# Patient Record
Sex: FEMALE | Race: AMERICAN INDIAN OR ALASKA NATIVE
[De-identification: names, ages, dates, MRNs, and addresses within clinical notes are randomized per-mention and may not be internally consistent; named-entity substitution may affect disease eponyms.]

---

## 2022-09-07 LAB
ALBUMIN SERPL-MCNC: 3.8 G/DL (ref 3.9–5)
ALT SERPL-CCNC: 15 UNITS/L (ref 7–56)
BUN SERPL-MCNC: 9 MG/DL (ref 7–17)
BUN/CREAT SERPL: 15 %
CALCIUM SERPL-MCNC: 9.1 MG/DL (ref 8.4–10.2)
HCT VFR BLD CALC: 41.5 % (ref 30.3–42.9)
HEMOLYSIS INDEX: 2
HGB BLD-MCNC: 13.5 GM/DL (ref 10.1–14.3)
MCHC RBC AUTO-ENTMCNC: 33 % (ref 30–34)
MCV RBC AUTO: 93 FL (ref 79–97)
PLATELET # BLD: 308 K/MM3 (ref 140–440)
RBC # BLD AUTO: 4.45 M/MM3 (ref 3.65–5.03)

## 2022-09-07 NOTE — ANESTHESIA CONSULTATION
Anesthesia Consult and Med Hx


Date of service: 09/12/22





- Airway


Anesthetic Teeth Evaluation: Good (One missing)


ROM Head & Neck: Adequate


Mental/Hyoid Distance: Adequate


Mallampati Class: Class II


Intubation Access Assessment: Good





- Pre-Operative Health Status


ASA Pre-Surgery Classification: ASA3


Proposed Anesthetic Plan: General





- Pulmonary


Hx Smoking: Yes (FORMER SMOKER. STOPPED 01/01/22.)


Hx Asthma: No


Hx Respiratory Symptoms: No (+2FS; walks four miles)


COPD: No


Hx Pneumonia: No


Hx Sleep Apnea: No





- Central Nervous System


Hx Psychiatric Problems: No





- Hematic


Hx Anemia: No


Hx Sickle Cell Disease: No





- Other Systems


Hx Alcohol Use: Yes (2 DRINKS/MONTH)


Hx Substance Use: No


Hx Cancer: No


Hx Obesity: Yes





- Additional Comments


Anesthesia Medical History Comments: Cardiac & pulmonary Reports reviewed

## 2022-09-12 ENCOUNTER — HOSPITAL ENCOUNTER (INPATIENT)
Dept: HOSPITAL 5 - 3A | Age: 32
LOS: 1 days | Discharge: HOME | DRG: 621 | End: 2022-09-13
Attending: SURGERY | Admitting: SURGERY
Payer: COMMERCIAL

## 2022-09-12 DIAGNOSIS — Z87.891: ICD-10-CM

## 2022-09-12 DIAGNOSIS — Z20.822: ICD-10-CM

## 2022-09-12 DIAGNOSIS — Z88.0: ICD-10-CM

## 2022-09-12 DIAGNOSIS — E66.01: Primary | ICD-10-CM

## 2022-09-12 PROCEDURE — 85027 COMPLETE CBC AUTOMATED: CPT

## 2022-09-12 PROCEDURE — 0BQT4ZZ REPAIR DIAPHRAGM, PERCUTANEOUS ENDOSCOPIC APPROACH: ICD-10-PCS | Performed by: SURGERY

## 2022-09-12 PROCEDURE — 88307 TISSUE EXAM BY PATHOLOGIST: CPT

## 2022-09-12 PROCEDURE — 0DB64Z3 EXCISION OF STOMACH, PERCUTANEOUS ENDOSCOPIC APPROACH, VERTICAL: ICD-10-PCS | Performed by: SURGERY

## 2022-09-12 PROCEDURE — 84703 CHORIONIC GONADOTROPIN ASSAY: CPT

## 2022-09-12 PROCEDURE — 85025 COMPLETE CBC W/AUTO DIFF WBC: CPT

## 2022-09-12 PROCEDURE — 80053 COMPREHEN METABOLIC PANEL: CPT

## 2022-09-12 PROCEDURE — C9113 INJ PANTOPRAZOLE SODIUM, VIA: HCPCS

## 2022-09-12 PROCEDURE — 36415 COLL VENOUS BLD VENIPUNCTURE: CPT

## 2022-09-12 PROCEDURE — 88342 IMHCHEM/IMCYTCHM 1ST ANTB: CPT

## 2022-09-12 PROCEDURE — U0003 INFECTIOUS AGENT DETECTION BY NUCLEIC ACID (DNA OR RNA); SEVERE ACUTE RESPIRATORY SYNDROME CORONAVIRUS 2 (SARS-COV-2) (CORONAVIRUS DISEASE [COVID-19]), AMPLIFIED PROBE TECHNIQUE, MAKING USE OF HIGH THROUGHPUT TECHNOLOGIES AS DESCRIBED BY CMS-2020-01-R: HCPCS

## 2022-09-12 RX ADMIN — SIMETHICONE PRN MG: 80 TABLET, CHEWABLE ORAL at 17:38

## 2022-09-12 RX ADMIN — MIDAZOLAM NR MG: 1 INJECTION INTRAMUSCULAR; INTRAVENOUS at 12:48

## 2022-09-12 RX ADMIN — ENOXAPARIN SODIUM SCH: 100 INJECTION SUBCUTANEOUS at 17:41

## 2022-09-12 RX ADMIN — ACETAMINOPHEN NR MLS/HR: 10 INJECTION, SOLUTION INTRAVENOUS at 18:33

## 2022-09-12 RX ADMIN — MIDAZOLAM NR MG: 1 INJECTION INTRAMUSCULAR; INTRAVENOUS at 11:38

## 2022-09-12 RX ADMIN — ACETAMINOPHEN NR MLS/HR: 10 INJECTION, SOLUTION INTRAVENOUS at 12:44

## 2022-09-12 RX ADMIN — SIMETHICONE PRN MG: 80 TABLET, CHEWABLE ORAL at 23:09

## 2022-09-12 RX ADMIN — ACETAMINOPHEN SCH MLS/HR: 10 INJECTION, SOLUTION INTRAVENOUS at 18:34

## 2022-09-12 RX ADMIN — METRONIDAZOLE SCH MLS/HR: 5 INJECTION, SOLUTION INTRAVENOUS at 21:32

## 2022-09-12 RX ADMIN — KETOROLAC TROMETHAMINE SCH MG: 30 INJECTION, SOLUTION INTRAMUSCULAR at 21:31

## 2022-09-12 RX ADMIN — SODIUM CHLORIDE, SODIUM LACTATE, POTASSIUM CHLORIDE, AND CALCIUM CHLORIDE SCH MLS/HR: .6; .31; .03; .02 INJECTION, SOLUTION INTRAVENOUS at 11:39

## 2022-09-12 RX ADMIN — KETOROLAC TROMETHAMINE SCH: 30 INJECTION, SOLUTION INTRAMUSCULAR at 17:29

## 2022-09-12 RX ADMIN — SODIUM CHLORIDE, SODIUM LACTATE, POTASSIUM CHLORIDE, AND CALCIUM CHLORIDE SCH MLS/HR: .6; .31; .03; .02 INJECTION, SOLUTION INTRAVENOUS at 17:38

## 2022-09-12 RX ADMIN — PANTOPRAZOLE SODIUM SCH MG: 40 INJECTION, POWDER, FOR SOLUTION INTRAVENOUS at 15:51

## 2022-09-12 NOTE — OPERATIVE REPORT
Operative Report


Operative Report: 





DATE:9/12/2022





Surgeon: Rosario Jovel MD





Assistant surgeon:





Pre-op Dx: morbid obesity





Post-op Dx: morbid obesity





Procedure: 1. laparoscopic sleeve gastrectomy





Anesthesia: GETA and TAP block





EBL: <10ml





Specimen: gastric remnant





Complication: none immediate





Indication: 32 year old female with a history of morbid obesity . Pt is here for

sleeve gastrectomy for weight loss to achieve healthier weight and improve or 

resolve his co-morbidities. She expressed understanding of the risks and 

benefits. 





PROCEDURE IN DETAIL: After consent was reviewed, patient was taken back to the 

operating room, where patient was placed supine on the bed with both arms out. 

The patient's legs were doubly strapped to the bed. Patient had a foot board in 

place. Patient had a body warmer placed by anesthesia. General anesthesia was 

induced with successful endotracheal intubation. Patient was then prepped and 

draped in normal sterile surgical fashion. After a time-out was called, I made a

stab incision in the left subcostal area and placed a Veress needle through this

incision and insufflated the abdomen to 15 mmHg pressure. I then counted down a 

handsbreadth below the xiphoid process in the midline and slightly left lateral 

injected local anesthetic and made about 1 cm transverse incision. I then used a

5-mm Optiview trocar to enter into the abdomen. There was no gross injury to any

intra-abdominal structures.  I then placed a 30-degree scope through this port 

and inspected the abdomen. I then placed a 8-mm port in the right upper 

quadrant,  and 1 5mm in the epigastric area below the costovertebral angle. I 

then placed a 15-mm port about a handsbreadth in the right mid abdomen. After 

which a 5mm port was placed in left upper quadrant port along the anterior 

axillary line in a similar fashion.  A liver retractor was placed to the 

epigastric port to elevate the left lateral lobe and liver.  There was a small 

separation of the crura appreciated that was accentuated with right and left 

crural dissection. Hiatal hernia sac was dissected from the crura until the GE 

junction was resting about 2cm below the level of the diaphragm without tension.

An anterior crura-plasty was preformed a U-stitch using surgidac suture.   The 

anterior gastric fat pad was excised.  Starting approximately 6 cm proximal to 

the pylorus, using a Enseal device the short gastrics were taken all the way to 

the left ashanti.  Once the lateral portion of the stomach was mobile anesthesia 

passed a 40 Amharic bougie along the medial aspect to act as a stent.  Using 

serial firings of endoscopic stapler to gold, followed by 4 blue, the lateral 

portion of the stomach was transected making sure to did not close to the 2 cm 

to the incisura. All staple loads were supported with Ethicon buttress strips. 

The sleeve stomach was seen to be without kink obstruction or twisting.  The 

pressure was decreased to 12 mmHg.  The staple line was inspected for 

approximately 5 minutes.  There was no significant bleeding appreciated except 

for a slight ooze at the most distal portion of the staple line.   Bleeding was 

minimal and easily controlled with minimal cautery.  Vistaseal was then sprayed 

along the entirety of the staple line.  The liver retractor was removed. A TAP 

block was performed with 60ml of 0.25% marcaine along bilateral mid axillary 

lines starting from the subcostal region to just below the level of the 

umbilicus  This was after the gastric remnant was grasped and pulled into the 15

mm trocar site.  The stomach was extracted via the 15 mm trocar site.  After the

fascia had to be stretched with a Apolonia clamp to easily remove the stomach, the 

fascia was closed using a lemuel jovanni device at the level of the fascia with

an 0 PDS.  trocars were removed under direct visualization. All skin incisions 

were closed with 4-0 Monocryl followed by Dermabond.  Patient was awoken, 

extubated, and taken to recovery stable condition.  All counts were correct.

## 2022-09-13 VITALS — SYSTOLIC BLOOD PRESSURE: 87 MMHG | DIASTOLIC BLOOD PRESSURE: 53 MMHG

## 2022-09-13 LAB
ALBUMIN SERPL-MCNC: 3.6 G/DL (ref 3.9–5)
ALT SERPL-CCNC: 33 UNITS/L (ref 7–56)
BASOPHILS # (AUTO): 0 K/MM3 (ref 0–0.1)
BASOPHILS NFR BLD AUTO: 0.3 % (ref 0–1.8)
BUN SERPL-MCNC: 6 MG/DL (ref 7–17)
BUN/CREAT SERPL: 12 %
CALCIUM SERPL-MCNC: 8.6 MG/DL (ref 8.4–10.2)
EOSINOPHIL # BLD AUTO: 0 K/MM3 (ref 0–0.4)
EOSINOPHIL NFR BLD AUTO: 0 % (ref 0–4.3)
HCT VFR BLD CALC: 37.7 % (ref 30.3–42.9)
HEMOLYSIS INDEX: 2
HGB BLD-MCNC: 12.4 GM/DL (ref 10.1–14.3)
LYMPHOCYTES # BLD AUTO: 0.7 K/MM3 (ref 1.2–5.4)
LYMPHOCYTES NFR BLD AUTO: 9.7 % (ref 13.4–35)
MCHC RBC AUTO-ENTMCNC: 33 % (ref 30–34)
MCV RBC AUTO: 94 FL (ref 79–97)
MONOCYTES # (AUTO): 0.3 K/MM3 (ref 0–0.8)
MONOCYTES % (AUTO): 4.5 % (ref 0–7.3)
PLATELET # BLD: 268 K/MM3 (ref 140–440)
RBC # BLD AUTO: 4.04 M/MM3 (ref 3.65–5.03)

## 2022-09-13 RX ADMIN — SIMETHICONE PRN MG: 80 TABLET, CHEWABLE ORAL at 13:46

## 2022-09-13 RX ADMIN — PANTOPRAZOLE SODIUM SCH MG: 40 INJECTION, POWDER, FOR SOLUTION INTRAVENOUS at 10:23

## 2022-09-13 RX ADMIN — METRONIDAZOLE SCH MLS/HR: 5 INJECTION, SOLUTION INTRAVENOUS at 05:53

## 2022-09-13 RX ADMIN — ACETAMINOPHEN SCH MLS/HR: 10 INJECTION, SOLUTION INTRAVENOUS at 00:46

## 2022-09-13 RX ADMIN — KETOROLAC TROMETHAMINE SCH MG: 30 INJECTION, SOLUTION INTRAMUSCULAR at 03:28

## 2022-09-13 RX ADMIN — ACETAMINOPHEN SCH MLS/HR: 10 INJECTION, SOLUTION INTRAVENOUS at 10:12

## 2022-09-13 RX ADMIN — ACETAMINOPHEN SCH: 10 INJECTION, SOLUTION INTRAVENOUS at 04:00

## 2022-09-13 RX ADMIN — ENOXAPARIN SODIUM SCH MG: 100 INJECTION SUBCUTANEOUS at 10:26

## 2022-09-13 RX ADMIN — SODIUM CHLORIDE, SODIUM LACTATE, POTASSIUM CHLORIDE, AND CALCIUM CHLORIDE SCH MLS/HR: .6; .31; .03; .02 INJECTION, SOLUTION INTRAVENOUS at 10:16

## 2022-09-13 RX ADMIN — METRONIDAZOLE SCH MLS/HR: 5 INJECTION, SOLUTION INTRAVENOUS at 10:11

## 2022-09-13 NOTE — POST ANESTHESIA EVALUATION
- Post Anesthesia Evaluation


Patient Participated: Yes


Airway Patent: Yes


Stable Respiratory Function: Yes


Nausea/Vomiting: No


Temp > 96.8F: Yes


Pain Manageable: Yes


Adequeate Hydration: Yes


Anesthesia Complications: No


Block Receding Appropriately: No


Patient on Ventilator: No


Other Comments: pt was highly satisfied with anesthesia; reports no recall, no 

n/v, satisfied with pain control; mild sore throat- advised warm liquids, throat

lozenges, rest voice

## 2022-09-13 NOTE — DISCHARGE SUMMARY
Providers





- Providers


Date of Admission: 


09/12/22 10:24





Date of discharge: 09/13/22


Attending physician: 


ESTHER PHILLIPS MD





                                        





09/12/22 15:12


Physical Therapy Evaluation and Treat [CONS] Routine 


   Comment: 


   Reason For Exam: s/p bariatric surgery











Primary care physician: 


PRIMARY CARE MD








Hospitalization


Reason for admission: s/p bariatic surgery


Condition: Good


Procedures: 





lap gastric sleeve


Hospital course: 





Patient had an uneventful lapaproscopic sleeve gastrectomy.  She recovered well 

with adequate pain control.  She was tolerating oral liquids well and ambulating

 independently.  She remained afebrile and stable having vital signs and 

laboratory values within acceptable limits.  Patient was discharged on postop 

day #1 showing no gross clinical signs of leak or bleeding.  Patient will 

follow-up in the office in 2 weeks.


Disposition: 01 HOME / SELF CARE / HOMELESS


Final Discharge Diagnosis (Prints w/discharge instructions): morbid obesity





Core Measure Documentation





- Palliative Care


Palliative Care/ Comfort Measures: Not Applicable





- Core Measures


Any of the following diagnoses?: none





Exam





- Constitutional


Vitals: 


                                        











Temp Pulse Resp BP Pulse Ox


 


 98.6 F   57 L  16   84/53   97 


 


 09/13/22 12:03  09/13/22 12:03  09/13/22 12:03  09/13/22 12:03  09/13/22 12:03











General appearance: Present: no acute distress, obese





- EENT


Eyes: Present: PERRL


ENT: hearing intact





- Respiratory


Respiratory effort: normal





- Cardiovascular


Heart Sounds: Present: S1 & S2





- Extremities


Extremities: no ischemia





- Abdominal


General gastrointestinal: Present: soft, other (incisions c/d/i, appropriate 

tenderness to palpation)





Plan


Activity: advance as tolerated


Diet: clear liquids


Wound: open to air, keep clean and dry


Follow up with: 


ESTHER PHILLIPS MD [Staff Physician] - 7 Days


PRIMARY CARE,MD [Primary Care Provider] - 7 Days